# Patient Record
Sex: FEMALE
[De-identification: names, ages, dates, MRNs, and addresses within clinical notes are randomized per-mention and may not be internally consistent; named-entity substitution may affect disease eponyms.]

---

## 2022-04-07 ENCOUNTER — APPOINTMENT (OUTPATIENT)
Dept: ORTHOPEDIC SURGERY | Facility: CLINIC | Age: 42
End: 2022-04-07

## 2023-02-15 ENCOUNTER — APPOINTMENT (OUTPATIENT)
Dept: ORTHOPEDIC SURGERY | Facility: CLINIC | Age: 43
End: 2023-02-15
Payer: COMMERCIAL

## 2023-02-15 VITALS — WEIGHT: 165 LBS | BODY MASS INDEX: 25.01 KG/M2 | HEIGHT: 68 IN

## 2023-02-15 PROBLEM — Z00.00 ENCOUNTER FOR PREVENTIVE HEALTH EXAMINATION: Status: ACTIVE | Noted: 2023-02-15

## 2023-02-15 PROCEDURE — 99215 OFFICE O/P EST HI 40 MIN: CPT

## 2023-02-15 PROCEDURE — 72100 X-RAY EXAM L-S SPINE 2/3 VWS: CPT

## 2023-02-15 RX ORDER — LEVOTHYROXINE SODIUM 150 UG/1
150 TABLET ORAL
Refills: 0 | Status: ACTIVE | COMMUNITY

## 2023-02-15 RX ORDER — METHYLPREDNISOLONE 4 MG/1
4 TABLET ORAL
Qty: 1 | Refills: 0 | Status: ACTIVE | COMMUNITY
Start: 2023-02-15 | End: 1900-01-01

## 2023-02-15 NOTE — ASSESSMENT
[FreeTextEntry1] : Patient will begin Medrol.  Patient advised not to take any NSAIDs while taking the steroids. \par \par Patient given prescription for MRI, follow up after study is completed to discuss results. \par \par Begin HEP \par \par Recommend: - NSAID - Heating pad - Muscle relaxer - Core strengthening exercise - Hamstring stretching exercise Patient is given back rehabilitation exercise book.

## 2023-02-15 NOTE — HISTORY OF PRESENT ILLNESS
[de-identified] : Follow up lumbar spine. Last seen 2/3/22. \par Pt reports increase in back pain x~2 months with NKI\par Pt reports pain in bilat sides of lower back radiating up back\par Reports pain radiating into bilat legs- worse on the right (surgical side), intermittently into the R foot\par Pt reports some numbness and tingling in Right leg. \par Pt reports doing HEP and taking Advil with no relief.

## 2023-02-15 NOTE — REASON FOR VISIT
[FreeTextEntry2] : lower back pain ongoing since 2021 with no known injury\par s/p Laminectomy L4-5 7/14/2021

## 2023-02-19 ENCOUNTER — FORM ENCOUNTER (OUTPATIENT)
Age: 43
End: 2023-02-19

## 2023-02-20 ENCOUNTER — RESULT REVIEW (OUTPATIENT)
Age: 43
End: 2023-02-20

## 2023-03-02 ENCOUNTER — APPOINTMENT (OUTPATIENT)
Dept: ORTHOPEDIC SURGERY | Facility: CLINIC | Age: 43
End: 2023-03-02
Payer: COMMERCIAL

## 2023-03-02 VITALS — WEIGHT: 165 LBS | HEIGHT: 68 IN | BODY MASS INDEX: 25.01 KG/M2

## 2023-03-02 DIAGNOSIS — Z78.9 OTHER SPECIFIED HEALTH STATUS: ICD-10-CM

## 2023-03-02 PROCEDURE — 99215 OFFICE O/P EST HI 40 MIN: CPT

## 2023-03-02 NOTE — DATA REVIEWED
[MRI] : MRI [Lumbar Spine] : lumbar spine [Report was reviewed and noted in the chart] : The report was reviewed and noted in the chart [I independently reviewed and interpreted images and report] : I independently reviewed and interpreted images and report [FreeTextEntry1] : Left facet arthropathy with left foraminal stenosis and left lateral recess stenosis L4-5

## 2023-03-02 NOTE — ASSESSMENT
[FreeTextEntry1] : Left facet arthropathy with left foraminal and left lateral recess stenosis L4-5 \par \par Referral to pain management for injections, follow up 2 weeks after injection. \par \par Continue HEP \par \par Recommend: - NSAID - Heating pad - Muscle relaxer - Core strengthening exercise - Hamstring stretching exercise Patient is given back rehabilitation exercise book. \par \par Follow up in 2 months

## 2023-03-02 NOTE — HISTORY OF PRESENT ILLNESS
[de-identified] : Follow up lumbar spine MRI Results at . Admits to finishing Medrol pack with no relief. Admits to taking 1 Methocarbamol.

## 2023-05-03 ENCOUNTER — APPOINTMENT (OUTPATIENT)
Dept: ORTHOPEDIC SURGERY | Facility: CLINIC | Age: 43
End: 2023-05-03

## 2024-06-20 ENCOUNTER — APPOINTMENT (OUTPATIENT)
Dept: ORTHOPEDIC SURGERY | Facility: CLINIC | Age: 44
End: 2024-06-20

## 2024-06-20 DIAGNOSIS — M48.061 SPINAL STENOSIS, LUMBAR REGION WITHOUT NEUROGENIC CLAUDICATION: ICD-10-CM

## 2024-06-20 DIAGNOSIS — M41.56 OTHER SECONDARY SCOLIOSIS, LUMBAR REGION: ICD-10-CM

## 2024-06-20 DIAGNOSIS — Z86.39 PERSONAL HISTORY OF OTHER ENDOCRINE, NUTRITIONAL AND METABOLIC DISEASE: ICD-10-CM

## 2024-06-20 DIAGNOSIS — M47.814 SPONDYLOSIS W/OUT MYELOPATHY OR RADICULOPATHY, THORACIC REGION: ICD-10-CM

## 2024-06-20 DIAGNOSIS — M54.16 RADICULOPATHY, LUMBAR REGION: ICD-10-CM

## 2024-06-20 DIAGNOSIS — M51.36 OTHER INTERVERTEBRAL DISC DEGENERATION, LUMBAR REGION: ICD-10-CM

## 2024-06-20 DIAGNOSIS — M51.37 OTHER INTERVERTEBRAL DISC DEGENERATION, LUMBOSACRAL REGION: ICD-10-CM

## 2024-06-20 DIAGNOSIS — M47.817 SPONDYLOSIS W/OUT MYELOPATHY OR RADICULOPATHY, LUMBOSACRAL REGION: ICD-10-CM

## 2024-06-20 PROCEDURE — 72100 X-RAY EXAM L-S SPINE 2/3 VWS: CPT

## 2024-06-20 PROCEDURE — 99213 OFFICE O/P EST LOW 20 MIN: CPT

## 2024-06-20 PROCEDURE — 72070 X-RAY EXAM THORAC SPINE 2VWS: CPT

## 2024-06-20 RX ORDER — GABAPENTIN 300 MG/1
300 CAPSULE ORAL
Qty: 30 | Refills: 0 | Status: DISCONTINUED | COMMUNITY
Start: 2023-03-02 | End: 2024-06-20

## 2024-06-20 RX ORDER — TOPIRAMATE 50 MG/1
TABLET, COATED ORAL
Refills: 0 | Status: ACTIVE | COMMUNITY

## 2024-06-20 RX ORDER — LEVETIRACETAM 1000 MG/1
TABLET, FILM COATED ORAL
Refills: 0 | Status: ACTIVE | COMMUNITY

## 2024-06-20 RX ORDER — METHOCARBAMOL 750 MG/1
750 TABLET, FILM COATED ORAL
Qty: 60 | Refills: 0 | Status: DISCONTINUED | COMMUNITY
Start: 2023-02-15 | End: 2024-06-20

## 2024-06-20 RX ORDER — GABAPENTIN 300 MG/1
300 CAPSULE ORAL
Qty: 30 | Refills: 1 | Status: ACTIVE | COMMUNITY
Start: 2024-06-20 | End: 1900-01-01

## 2024-06-20 RX ORDER — LEVOTHYROXINE SODIUM 137 UG/1
TABLET ORAL
Refills: 0 | Status: ACTIVE | COMMUNITY

## 2024-06-20 RX ORDER — FLUOXETINE HYDROCHLORIDE 20 MG/1
20 CAPSULE ORAL
Refills: 0 | Status: ACTIVE | COMMUNITY

## 2024-06-20 NOTE — HISTORY OF PRESENT ILLNESS
[de-identified] : Follow up lumbar spine- last seen 3/2023 Pt had 3 IVANNA with Dr Carr after last visit with slight, short term relief- states pain has returned  Patient reports pain in middle of lower back radiating up spine to shoulder blades  Pt states her "spine feels swollen, tender, and painful to the touch" with "throbbing, pulsing" pain Reports tendnerness in bilat sides of lower back.  Denies treatments or images of the back since last visit  Pt states she was taking Gabapentin in the past with relief but does not have any left.  Reports no relief with OTC meds

## 2024-06-20 NOTE — ASSESSMENT
[FreeTextEntry1] : 44 yo female presents today for eval of her low back pain. XR shows scoliosis and DDD L5-S1. I recommend proceeding with new MRIs of spine for eval of soft tissue. Also, given no relief with IVANNA x3 with Dr. Carr.   - Patient given prescription for thoracic and lumbar MRI, follow up after study is completed to discuss results.   - Recommend physical therapy to regain range of motion, strengthening and symptomatic improvement. Prescription given in office today.   - Patient given referral to medical massage for evaluation and treatment.   - Recommend NSAIDs PRN - Recommend heating pad use to decrease muscle spasm - Discussed the importance of home exercises, including but not limited to hamstring stretching and core strengthening   Patient was educated on their diagnosis today. All questions answered and patient expressed understanding.   F/U after MRI

## 2024-06-20 NOTE — IMAGING
[Disc space narrowing] : Disc space narrowing [Scoliosis] : Scoliosis [Straightening consistent with spasm] : Straightening consistent with spasm

## 2024-06-24 ENCOUNTER — RESULT REVIEW (OUTPATIENT)
Age: 44
End: 2024-06-24

## 2024-08-07 ENCOUNTER — APPOINTMENT (OUTPATIENT)
Dept: ORTHOPEDIC SURGERY | Facility: CLINIC | Age: 44
End: 2024-08-07

## 2024-08-07 PROBLEM — M47.814 THORACIC SPONDYLOSIS: Status: RESOLVED | Noted: 2024-06-20 | Resolved: 2024-08-07

## 2024-08-07 PROCEDURE — 99214 OFFICE O/P EST MOD 30 MIN: CPT

## 2024-08-07 NOTE — HISTORY OF PRESENT ILLNESS
[de-identified] : Follow up thoracic and lumbar spine MRI Results at . Patient states she has constant pain. Patient states she will be starting PT tomorrow. Patient admits to having 1 medical massage. Patient admits to taking Gabapentin.  Today's Pain 6/10

## 2024-08-07 NOTE — DATA REVIEWED
[Thoracic Spine] : thoracic spine [MRI] : MRI [Lumbar Spine] : lumbar spine [I independently reviewed and interpreted images and report] : I independently reviewed and interpreted images and report [FreeTextEntry1] : 6/2024 MRI of T-Spine was reviewed today and is as follows:  Small HNP right T7-8  6/2024 MRI of L-Spine was reviewed today and is as follows:  DDD L4-5 Right hemilaminectomy L4-5 with small disc bulge

## 2024-08-07 NOTE — ASSESSMENT
[FreeTextEntry1] : 6/2024 MRI of T-Spine was reviewed today and is as follows:  Small HNP right T7-8  6/2024 MRI of L-Spine was reviewed today and is as follows:  DDD L4-5 Right hemilaminectomy L4-5 with small disc bulge  44 yo female presents today for eval of her low back pain. MRIs detailed above show small HNP in thoracic and lumbar regions. However, incidental findings of HNP at C5-6 seen on thoracic MRI. I recommend MRI for further eval of stenosis.   - Patient given prescription for cervical MRI, follow up after study is completed to discuss results.   - Continue physical therapy to regain range of motion, strengthening and symptomatic improvement. Prescription given in office today.   - Patient given referral to medical massage for evaluation and treatment.   - Recommend NSAIDs PRN - Recommend heating pad use to decrease muscle spasm - Discussed the importance of home exercises, including but not limited to hamstring stretching and core strengthening   Patient was educated on their diagnosis today. All questions answered and patient expressed understanding.   F/U after MRI

## 2024-08-09 ENCOUNTER — RESULT REVIEW (OUTPATIENT)
Age: 44
End: 2024-08-09

## 2024-08-22 ENCOUNTER — APPOINTMENT (OUTPATIENT)
Dept: ORTHOPEDIC SURGERY | Facility: CLINIC | Age: 44
End: 2024-08-22

## 2024-08-22 DIAGNOSIS — M48.061 SPINAL STENOSIS, LUMBAR REGION WITHOUT NEUROGENIC CLAUDICATION: ICD-10-CM

## 2024-08-22 DIAGNOSIS — M54.16 RADICULOPATHY, LUMBAR REGION: ICD-10-CM

## 2024-08-22 DIAGNOSIS — M51.36 OTHER INTERVERTEBRAL DISC DEGENERATION, LUMBAR REGION: ICD-10-CM

## 2024-08-22 DIAGNOSIS — M41.56 OTHER SECONDARY SCOLIOSIS, LUMBAR REGION: ICD-10-CM

## 2024-08-22 DIAGNOSIS — M51.24 OTHER INTERVERTEBRAL DISC DISPLACEMENT, THORACIC REGION: ICD-10-CM

## 2024-08-22 DIAGNOSIS — M51.37 OTHER INTERVERTEBRAL DISC DEGENERATION, LUMBOSACRAL REGION: ICD-10-CM

## 2024-08-22 DIAGNOSIS — M47.817 SPONDYLOSIS W/OUT MYELOPATHY OR RADICULOPATHY, LUMBOSACRAL REGION: ICD-10-CM

## 2024-08-22 DIAGNOSIS — M48.02 SPINAL STENOSIS, CERVICAL REGION: ICD-10-CM

## 2024-08-22 DIAGNOSIS — M50.20 OTHER CERVICAL DISC DISPLACEMENT, UNSPECIFIED CERVICAL REGION: ICD-10-CM

## 2024-08-22 PROCEDURE — 99213 OFFICE O/P EST LOW 20 MIN: CPT

## 2024-08-22 NOTE — ASSESSMENT
"Chief Complaint   Patient presents with     Cough       Initial /72 (BP Location: Left arm, Patient Position: Chair, Cuff Size: Adult Regular)  Pulse 84  Temp 99.5  F (37.5  C) (Oral)  Wt 151 lb 6.4 oz (68.7 kg)  SpO2 93%  BMI 24.81 kg/m2 Estimated body mass index is 24.81 kg/(m^2) as calculated from the following:    Height as of 12/18/16: 5' 5.5\" (1.664 m).    Weight as of this encounter: 151 lb 6.4 oz (68.7 kg).  Medication Reconciliation: complete       Melba Gaston    " [FreeTextEntry1] : 8/2024 MRI of the C-Spine was reviewed today and is as follows: Small HNP C4-5 without nerve compression Large HNP C5-6 with moderate to severe stenosis   6/2024 MRI of T-Spine was reviewed today and is as follows:  Small HNP right T7-8  6/2024 MRI of L-Spine was reviewed today and is as follows:  DDD L4-5 Right hemilaminectomy L4-5 with small disc bulge  42 yo female presents today for eval of her low back pain. MRIs detailed above show stenosis, primarily in the neck. I recommend IVANNA for neck and PT for neck and back for relief. If no relief, CDA may be indicated.   - Referral to pain management for cervical IVANNA, follow up 2 weeks after injection.   - Continue physical therapy to regain range of motion, strengthening and symptomatic improvement. Prescription given in office today.   - Patient given referral to medical massage for evaluation and treatment.   - Recommend NSAIDs PRN - Recommend heating pad use to decrease muscle spasm - Discussed the importance of home exercises, including but not limited to hamstring stretching and core strengthening   Patient was educated on their diagnosis today. All questions answered and patient expressed understanding.   Follow up in 2 months

## 2024-08-22 NOTE — HISTORY OF PRESENT ILLNESS
[de-identified] : Body part: neck Better/ Worse/ Same since last visit: same, feels stiff and achy Treatments since last visit: cervical spine MRI at [facility], PT 2x a week, massage therapy 1-2x a week Difficulty with:  Radicular symptoms: into bilateral shoulder blades Paresthesia: none Current medications for pain: Gabapentin Assistive walking device: none  Today's Pain: 5/10

## 2024-08-22 NOTE — DATA REVIEWED
[MRI] : MRI [Cervical Spine] : cervical spine [I independently reviewed and interpreted images and report] : I independently reviewed and interpreted images and report [FreeTextEntry1] : 8/2024 MRI of the C-Spine was reviewed today and is as follows: Small HNP C4-5 without nerve compression Large HNP C5-6 with moderate to severe stenosis

## 2024-09-18 ENCOUNTER — APPOINTMENT (OUTPATIENT)
Dept: ORTHOPEDIC SURGERY | Facility: CLINIC | Age: 44
End: 2024-09-18
Payer: COMMERCIAL

## 2024-09-18 DIAGNOSIS — M51.36 OTHER INTERVERTEBRAL DISC DEGENERATION, LUMBAR REGION: ICD-10-CM

## 2024-09-18 DIAGNOSIS — M51.24 OTHER INTERVERTEBRAL DISC DISPLACEMENT, THORACIC REGION: ICD-10-CM

## 2024-09-18 DIAGNOSIS — M41.56 OTHER SECONDARY SCOLIOSIS, LUMBAR REGION: ICD-10-CM

## 2024-09-18 DIAGNOSIS — M48.02 SPINAL STENOSIS, CERVICAL REGION: ICD-10-CM

## 2024-09-18 DIAGNOSIS — M51.37 OTHER INTERVERTEBRAL DISC DEGENERATION, LUMBOSACRAL REGION: ICD-10-CM

## 2024-09-18 DIAGNOSIS — M47.817 SPONDYLOSIS W/OUT MYELOPATHY OR RADICULOPATHY, LUMBOSACRAL REGION: ICD-10-CM

## 2024-09-18 DIAGNOSIS — M48.061 SPINAL STENOSIS, LUMBAR REGION WITHOUT NEUROGENIC CLAUDICATION: ICD-10-CM

## 2024-09-18 DIAGNOSIS — M54.16 RADICULOPATHY, LUMBAR REGION: ICD-10-CM

## 2024-09-18 DIAGNOSIS — M50.20 OTHER CERVICAL DISC DISPLACEMENT, UNSPECIFIED CERVICAL REGION: ICD-10-CM

## 2024-09-18 PROCEDURE — 99214 OFFICE O/P EST MOD 30 MIN: CPT

## 2024-09-18 NOTE — ASSESSMENT
[FreeTextEntry1] :   8/2024 MRI of the C-Spine was reviewed today and is as follows: Small HNP C4-5 without nerve compression Large HNP C5-6 with moderate to severe stenosis   6/2024 MRI of T-Spine was reviewed today and is as follows:  Small HNP right T7-8  6/2024 MRI of L-Spine was reviewed today and is as follows:  DDD L4-5 Right hemilaminectomy L4-5 with small disc bulge  42 yo female presents today for eval of her low back pain. MRIs detailed above show stenosis, primarily in the neck. Patient has had nerve block, PT, and medication management for her neck with minimal relief in symptoms. Given persistent neck pain and radiculopathy, surgical management is recommended.   - Patient with persistent symptoms refractory to non-operative care. Patient is seeking surgical options. Patient is a candidate for surgery after failing extensive non operative care.   - Recommend NSAIDs PRN - Recommend heating pad use to decrease muscle spasm - Discussed the importance of home exercises, including but not limited to hamstring stretching and core strengthening   Patient was educated on their diagnosis today. All questions answered and patient expressed understanding.   Follow up in 2 weeks after surgery

## 2024-09-18 NOTE — DISCUSSION/SUMMARY
[de-identified] : I discussed non operative and operative treatment options in great detail with the patient. I discussed treatment options, including but not limited to, 1. Non operative treatment - rest, NSAID, physical therapy etc. 2. Interventional treatment - injections etc. 3. Surgical treatment - Cervical disc arthroplasty C5-6   - Patient with persistent symptoms refractory to non-operative care. Patient is seeking surgical options. Patient is a candidate for surgery after failing extensive non operative care.  Patient wants to proceed with surgical intervention after failing nonoperative care. I had a long discussion with the patient about the rational and goal of the surgery as well as expected outcome. I explained postoperative rehabilitation and recovery process to the patient. I encouraged patient to seek a second opinion regarding surgery. I discussed risks, benefits and alternatives of the procedure in detail with the patient. I counseled patient about risks and possible complications, including but not limited to, infection, bleeding, nerve injury, arterial and venous injury, single or multiple muscle group weakness, dural tear, CSF leak, pseudomeningocele, arachnoiditis, CSF fistula, meningitis, discitis, osteomyelitis, esophageal tear, dysphagia, hoarseness, Richie's syndrome, thoracic duct injury, epidural hematoma, DVT, PE, CRPS, MI, paralysis, need for fusion, instrumentation malposition, adjacent segment disease and risks of anesthesia. I explained to the patient that the surgical outcome is unpredictable and there is no guarantee that the symptoms will resolve after the surgery. The patient understands and wishes to proceed. All questions were answered and patient was given information to review.

## 2024-09-18 NOTE — HISTORY OF PRESENT ILLNESS
[de-identified] : Body part: neck Better/ Worse/ Same since last visit: same Treatments since last visit: occipital nerve block, PT 2x a week, massage therapy 1-2x a week Radicular symptoms: into bilateral shoulder blades Paresthesia: none Current medications for pain: Gabapentin Assistive walking device: none  Today's Pain: 6.5/10

## 2024-10-09 RX ORDER — GABAPENTIN 600 MG/1
600 TABLET, COATED ORAL
Qty: 30 | Refills: 1 | Status: ACTIVE | COMMUNITY
Start: 2024-10-09 | End: 1900-01-01

## 2024-12-03 ENCOUNTER — APPOINTMENT (OUTPATIENT)
Dept: ORTHOPEDIC SURGERY | Facility: HOSPITAL | Age: 44
End: 2024-12-03

## 2024-12-10 RX ORDER — CLINDAMYCIN HYDROCHLORIDE 300 MG/1
300 CAPSULE ORAL
Qty: 28 | Refills: 0 | Status: ACTIVE | COMMUNITY
Start: 2024-12-10 | End: 1900-01-01

## 2024-12-11 ENCOUNTER — APPOINTMENT (OUTPATIENT)
Dept: ORTHOPEDIC SURGERY | Facility: CLINIC | Age: 44
End: 2024-12-11
Payer: COMMERCIAL

## 2024-12-11 PROCEDURE — 99024 POSTOP FOLLOW-UP VISIT: CPT

## 2024-12-13 ENCOUNTER — NON-APPOINTMENT (OUTPATIENT)
Age: 44
End: 2024-12-13

## 2024-12-13 ENCOUNTER — APPOINTMENT (OUTPATIENT)
Dept: ORTHOPEDIC SURGERY | Facility: CLINIC | Age: 44
End: 2024-12-13
Payer: COMMERCIAL

## 2024-12-13 DIAGNOSIS — M51.24 OTHER INTERVERTEBRAL DISC DISPLACEMENT, THORACIC REGION: ICD-10-CM

## 2024-12-13 DIAGNOSIS — M48.061 SPINAL STENOSIS, LUMBAR REGION WITHOUT NEUROGENIC CLAUDICATION: ICD-10-CM

## 2024-12-13 DIAGNOSIS — Z98.890 OTHER SPECIFIED POSTPROCEDURAL STATES: ICD-10-CM

## 2024-12-13 PROCEDURE — 99024 POSTOP FOLLOW-UP VISIT: CPT

## 2024-12-13 PROCEDURE — 72040 X-RAY EXAM NECK SPINE 2-3 VW: CPT

## 2024-12-18 ENCOUNTER — NON-APPOINTMENT (OUTPATIENT)
Age: 44
End: 2024-12-18

## 2025-01-10 ENCOUNTER — APPOINTMENT (OUTPATIENT)
Dept: ORTHOPEDIC SURGERY | Facility: CLINIC | Age: 45
End: 2025-01-10
Payer: COMMERCIAL

## 2025-01-10 DIAGNOSIS — Z98.890 OTHER SPECIFIED POSTPROCEDURAL STATES: ICD-10-CM

## 2025-01-10 DIAGNOSIS — M48.061 SPINAL STENOSIS, LUMBAR REGION WITHOUT NEUROGENIC CLAUDICATION: ICD-10-CM

## 2025-01-10 DIAGNOSIS — M51.24 OTHER INTERVERTEBRAL DISC DISPLACEMENT, THORACIC REGION: ICD-10-CM

## 2025-01-10 PROCEDURE — 72040 X-RAY EXAM NECK SPINE 2-3 VW: CPT

## 2025-01-10 PROCEDURE — 99024 POSTOP FOLLOW-UP VISIT: CPT

## 2025-04-11 ENCOUNTER — APPOINTMENT (OUTPATIENT)
Dept: ORTHOPEDIC SURGERY | Facility: CLINIC | Age: 45
End: 2025-04-11

## 2025-05-09 ENCOUNTER — APPOINTMENT (OUTPATIENT)
Dept: ORTHOPEDIC SURGERY | Facility: CLINIC | Age: 45
End: 2025-05-09
Payer: COMMERCIAL

## 2025-05-09 DIAGNOSIS — M54.16 RADICULOPATHY, LUMBAR REGION: ICD-10-CM

## 2025-05-09 DIAGNOSIS — M41.56 OTHER SECONDARY SCOLIOSIS, LUMBAR REGION: ICD-10-CM

## 2025-05-09 DIAGNOSIS — M48.061 SPINAL STENOSIS, LUMBAR REGION WITHOUT NEUROGENIC CLAUDICATION: ICD-10-CM

## 2025-05-09 DIAGNOSIS — M47.817 SPONDYLOSIS W/OUT MYELOPATHY OR RADICULOPATHY, LUMBOSACRAL REGION: ICD-10-CM

## 2025-05-09 DIAGNOSIS — M51.379 OTH INTVRT DISC DEGEN, LUMBOSACR W/O LUM BCK OR LW EXTRM PN: ICD-10-CM

## 2025-05-09 DIAGNOSIS — M51.369: ICD-10-CM

## 2025-05-09 PROCEDURE — 99213 OFFICE O/P EST LOW 20 MIN: CPT

## 2025-07-25 RX ORDER — METHYLPREDNISOLONE 4 MG/1
4 TABLET ORAL
Qty: 1 | Refills: 0 | Status: ACTIVE | COMMUNITY
Start: 2025-07-25 | End: 1900-01-01

## 2025-07-25 RX ORDER — METHOCARBAMOL 750 MG/1
750 TABLET, FILM COATED ORAL
Qty: 60 | Refills: 0 | Status: ACTIVE | COMMUNITY
Start: 2025-07-25 | End: 1900-01-01

## 2025-08-07 ENCOUNTER — APPOINTMENT (OUTPATIENT)
Dept: ORTHOPEDIC SURGERY | Facility: CLINIC | Age: 45
End: 2025-08-07
Payer: COMMERCIAL

## 2025-08-07 DIAGNOSIS — M48.061 SPINAL STENOSIS, LUMBAR REGION WITHOUT NEUROGENIC CLAUDICATION: ICD-10-CM

## 2025-08-07 DIAGNOSIS — M51.24 OTHER INTERVERTEBRAL DISC DISPLACEMENT, THORACIC REGION: ICD-10-CM

## 2025-08-07 DIAGNOSIS — Z98.890 OTHER SPECIFIED POSTPROCEDURAL STATES: ICD-10-CM

## 2025-08-07 PROCEDURE — 99214 OFFICE O/P EST MOD 30 MIN: CPT

## 2025-08-07 RX ORDER — CELECOXIB 100 MG/1
100 CAPSULE ORAL TWICE DAILY
Qty: 60 | Refills: 1 | Status: ACTIVE | COMMUNITY
Start: 2025-08-07 | End: 1900-01-01

## 2025-08-07 RX ORDER — DICLOFENAC SODIUM 10 MG/G
1 GEL TOPICAL 4 TIMES DAILY
Qty: 1 | Refills: 1 | Status: ACTIVE | COMMUNITY
Start: 2025-08-07 | End: 1900-01-01

## 2025-08-20 ENCOUNTER — APPOINTMENT (OUTPATIENT)
Dept: ORTHOPEDIC SURGERY | Facility: AMBULATORY SURGERY CENTER | Age: 45
End: 2025-08-20

## 2025-08-20 PROCEDURE — 64483 NJX AA&/STRD TFRM EPI L/S 1: CPT | Mod: RT

## 2025-08-20 PROCEDURE — 64484 NJX AA&/STRD TFRM EPI L/S EA: CPT | Mod: 50,59
